# Patient Record
Sex: FEMALE | Race: WHITE | NOT HISPANIC OR LATINO | Employment: OTHER | URBAN - METROPOLITAN AREA
[De-identification: names, ages, dates, MRNs, and addresses within clinical notes are randomized per-mention and may not be internally consistent; named-entity substitution may affect disease eponyms.]

---

## 2017-09-03 ENCOUNTER — OFFICE VISIT (OUTPATIENT)
Dept: URGENT CARE | Facility: CLINIC | Age: 82
End: 2017-09-03
Payer: MEDICARE

## 2017-09-03 VITALS
HEART RATE: 92 BPM | BODY MASS INDEX: 33.97 KG/M2 | DIASTOLIC BLOOD PRESSURE: 80 MMHG | HEIGHT: 64 IN | TEMPERATURE: 98.5 F | RESPIRATION RATE: 16 BRPM | OXYGEN SATURATION: 93 % | WEIGHT: 199 LBS | SYSTOLIC BLOOD PRESSURE: 110 MMHG

## 2017-09-03 DIAGNOSIS — E66.9 OBESITY (BMI 30-39.9): ICD-10-CM

## 2017-09-03 DIAGNOSIS — B35.4 TINEA CORPORIS: ICD-10-CM

## 2017-09-03 PROCEDURE — 99203 OFFICE O/P NEW LOW 30 MIN: CPT | Performed by: PHYSICIAN ASSISTANT

## 2017-09-03 RX ORDER — OMEPRAZOLE 20 MG/1
20 CAPSULE, DELAYED RELEASE ORAL DAILY
COMMUNITY

## 2017-09-03 RX ORDER — CETIRIZINE HYDROCHLORIDE 10 MG/1
10 TABLET ORAL DAILY
COMMUNITY

## 2017-09-03 RX ORDER — LOSARTAN POTASSIUM 100 MG/1
100 TABLET ORAL DAILY
COMMUNITY

## 2017-09-03 RX ORDER — PRAVASTATIN SODIUM 20 MG
20 TABLET ORAL NIGHTLY
COMMUNITY

## 2017-09-03 RX ORDER — M-VIT,TX,IRON,MINS/CALC/FOLIC 27MG-0.4MG
1 TABLET ORAL DAILY
COMMUNITY

## 2017-09-03 RX ORDER — FENOFIBRATE 48 MG/1
48 TABLET, COATED ORAL DAILY
COMMUNITY

## 2017-09-03 RX ORDER — ASPIRIN 81 MG/1
81 TABLET, CHEWABLE ORAL DAILY
COMMUNITY

## 2017-09-03 RX ORDER — CLOTRIMAZOLE 1 %
CREAM (GRAM) TOPICAL
Qty: 1 TUBE | Refills: 0 | Status: SHIPPED | OUTPATIENT
Start: 2017-09-03

## 2017-09-03 RX ORDER — AMLODIPINE BESYLATE 5 MG/1
5 TABLET ORAL DAILY
COMMUNITY

## 2017-09-03 ASSESSMENT — ENCOUNTER SYMPTOMS
FEVER: 0
SHORTNESS OF BREATH: 0
COUGH: 0
PALPITATIONS: 0

## 2017-09-03 NOTE — PROGRESS NOTES
Subjective:      Judith Giang is a 82 y.o. female who presents with Rash (red [place] rash on left calf)            Rash   This is a new problem. The current episode started in the past 7 days. The problem is unchanged. The affected locations include the left lower leg. The rash is characterized by redness and peeling. She was exposed to nothing. Pertinent negatives include no cough, fever or shortness of breath. Past treatments include nothing.       Review of Systems   Constitutional: Negative for fever and malaise/fatigue.   Respiratory: Negative for cough and shortness of breath.    Cardiovascular: Negative for chest pain and palpitations.   Skin: Positive for itching and rash.   All other systems reviewed and are negative.    PMH:  has no past medical history on file.  MEDS:   Current Outpatient Prescriptions:   •  amlodipine (NORVASC) 5 MG Tab, Take 5 mg by mouth every day., Disp: , Rfl:   •  losartan (COZAAR) 100 MG Tab, Take 100 mg by mouth every day., Disp: , Rfl:   •  pravastatin (PRAVACHOL) 20 MG Tab, Take 20 mg by mouth every evening., Disp: , Rfl:   •  fenofibrate (TRICOR) 48 MG Tab, Take 48 mg by mouth every day., Disp: , Rfl:   •  aspirin (ASA) 81 MG Chew Tab chewable tablet, Take 81 mg by mouth every day., Disp: , Rfl:   •  omeprazole (PRILOSEC) 20 MG delayed-release capsule, Take 20 mg by mouth every day., Disp: , Rfl:   •  cetirizine (ZYRTEC) 10 MG Tab, Take 10 mg by mouth every day., Disp: , Rfl:   •  polyethyl glycol-propyl glycol (SYSTANE ULTRA) 0.4-0.3 % Solution, Place 1 Drop in both eyes as needed., Disp: , Rfl:   •  therapeutic multivitamin-minerals (THERAGRAN-M) Tab, Take 1 Tab by mouth every day., Disp: , Rfl:   •  calcium carbonate-vitamin D (OSCAL 500/200 D-3) 500-200 MG-UNIT Tab, Take 1 Tab by mouth every morning with breakfast., Disp: , Rfl:   •  clotrimazole (LOTRIMIN) 1 % Cream, Apply to area twice daily for up to 4 weeks., Disp: 1 Tube, Rfl: 0  ALLERGIES: No Known Allergies  SURGHX: No  "past surgical history on file.  SOCHX:  reports that she has never smoked. She has never used smokeless tobacco.  FH: Family history was reviewed, no pertinent findings to report  Medications, Allergies, and current problem list reviewed today in Epic       Objective:     /80   Pulse 92   Temp 36.9 °C (98.5 °F)   Resp 16   Ht 1.613 m (5' 3.5\")   Wt 90.3 kg (199 lb)   SpO2 93%   BMI 34.70 kg/m²      Physical Exam   Constitutional: She appears well-developed and well-nourished.       Cardiovascular: Normal rate, regular rhythm and normal heart sounds.    Pulmonary/Chest: Effort normal and breath sounds normal.   Skin: Skin is warm and dry. Rash noted. There is erythema.   Psychiatric: She has a normal mood and affect. Her behavior is normal. Judgment and thought content normal.   Vitals reviewed.              Assessment/Plan:     1. Tinea corporis    - clotrimazole (LOTRIMIN) 1 % Cream; Apply to area twice daily for up to 4 weeks.  Dispense: 1 Tube; Refill: 0    2. Obesity (BMI 30-39.9)    - Patient identified as having weight management issue.  Appropriate orders and counseling given.    Differential diagnosis, natural history, supportive care, and indications for immediate follow-up discussed at length.   Follow-up with primary care provider within 4-5 days, emergency room precautions discussed.  Patient and/or family appears understanding of information.    "

## 2017-09-03 NOTE — PATIENT INSTRUCTIONS
Body Ringworm  Ringworm (tinea corporis) is a fungal infection of the skin on the body. This infection is not caused by worms, but is actually caused by a fungus. Fungus normally lives on the top of your skin and can be useful. However, in the case of ringworms, the fungus grows out of control and causes a skin infection. It can involve any area of skin on the body and can spread easily from one person to another (contagious). Ringworm is a common problem for children, but it can affect adults as well. Ringworm is also often found in athletes, especially wrestlers who share equipment and mats.   CAUSES   Ringworm of the body is caused by a fungus called dermatophyte. It can spread by:  · Touching other people who are infected.  · Touching infected pets.  · Touching or sharing objects that have been in contact with the infected person or pet (hats, baig, towels, clothing, sports equipment).  SYMPTOMS   · Itchy, raised red spots and bumps on the skin.  · Ring-shaped rash.  · Redness near the border of the rash with a clear center.  · Dry and scaly skin on or around the rash.  Not every person develops a ring-shaped rash. Some develop only the red, scaly patches.  DIAGNOSIS   Most often, ringworm can be diagnosed by performing a skin exam. Your caregiver may choose to take a skin scraping from the affected area. The sample will be examined under the microscope to see if the fungus is present.   TREATMENT   Body ringworm may be treated with a topical antifungal cream or ointment. Sometimes, an antifungal shampoo that can be used on your body is prescribed. You may be prescribed antifungal medicines to take by mouth if your ringworm is severe, keeps coming back, or lasts a long time.   HOME CARE INSTRUCTIONS   · Only take over-the-counter or prescription medicines as directed by your caregiver.  · Wash the infected area and dry it completely before applying your cream or ointment.  · When using antifungal shampoo to  treat the ringworm, leave the shampoo on the body for 3-5 minutes before rinsing.     · Wear loose clothing to stop clothes from rubbing and irritating the rash.  · Wash or change your bed sheets every night while you have the rash.  · Have your pet treated by your  if it has the same infection.  To prevent ringworm:   · Practice good hygiene.  · Wear sandals or shoes in public places and showers.  · Do not share personal items with others.  · Avoid touching red patches of skin on other people.  · Avoid touching pets that have bald spots or wash your hands after doing so.  SEEK MEDICAL CARE IF:   · Your rash continues to spread after 7 days of treatment.  · Your rash is not gone in 4 weeks.  · The area around your rash becomes red, warm, tender, and swollen.     This information is not intended to replace advice given to you by your health care provider. Make sure you discuss any questions you have with your health care provider.     Document Released: 12/15/2001 Document Revised: 09/11/2013 Document Reviewed: 07/01/2013  [employer] Interactive Patient Education ©2016 [employer]